# Patient Record
Sex: FEMALE | Race: OTHER | Employment: UNEMPLOYED | URBAN - METROPOLITAN AREA
[De-identification: names, ages, dates, MRNs, and addresses within clinical notes are randomized per-mention and may not be internally consistent; named-entity substitution may affect disease eponyms.]

---

## 2019-10-23 LAB
ANTIBODY SCREEN, EXTERNAL: NEGATIVE
CHLAMYDIA, EXTERNAL: NEGATIVE
HBSAG, EXTERNAL: NEGATIVE
HIV, EXTERNAL: NONREACTIVE
N. GONORRHEA, EXTERNAL: NEGATIVE
RPR, EXTERNAL: NONREACTIVE
RUBELLA, EXTERNAL: NORMAL
TYPE, ABO & RH, EXTERNAL: NORMAL

## 2020-04-07 LAB — GRBS, EXTERNAL: NEGATIVE

## 2020-04-20 ENCOUNTER — HOSPITAL ENCOUNTER (INPATIENT)
Age: 35
LOS: 2 days | Discharge: HOME | End: 2020-04-22
Attending: SPECIALIST | Admitting: SPECIALIST
Payer: SELF-PAY

## 2020-04-20 PROBLEM — Z34.90 PREGNANCY: Status: ACTIVE | Noted: 2020-04-20

## 2020-04-20 LAB
ALBUMIN SERPL-MCNC: 3 G/DL (ref 3.5–5)
ALBUMIN/GLOB SERPL: 0.7 {RATIO} (ref 1.1–2.2)
ALP SERPL-CCNC: 143 U/L (ref 45–117)
ALT SERPL-CCNC: 17 U/L (ref 12–78)
ANION GAP SERPL CALC-SCNC: 10 MMOL/L (ref 5–15)
AST SERPL-CCNC: 23 U/L (ref 15–37)
BASOPHILS # BLD: 0 K/UL (ref 0–0.1)
BASOPHILS NFR BLD: 0 % (ref 0–1)
BILIRUB SERPL-MCNC: 0.3 MG/DL (ref 0.2–1)
BUN SERPL-MCNC: 12 MG/DL (ref 6–20)
BUN/CREAT SERPL: 19 (ref 12–20)
CALCIUM SERPL-MCNC: 9.3 MG/DL (ref 8.5–10.1)
CHLORIDE SERPL-SCNC: 105 MMOL/L (ref 97–108)
CO2 SERPL-SCNC: 21 MMOL/L (ref 21–32)
CREAT SERPL-MCNC: 0.64 MG/DL (ref 0.55–1.02)
DIFFERENTIAL METHOD BLD: ABNORMAL
EOSINOPHIL # BLD: 0.1 K/UL (ref 0–0.4)
EOSINOPHIL NFR BLD: 1 % (ref 0–7)
ERYTHROCYTE [DISTWIDTH] IN BLOOD BY AUTOMATED COUNT: 13.5 % (ref 11.5–14.5)
GLOBULIN SER CALC-MCNC: 4.6 G/DL (ref 2–4)
GLUCOSE SERPL-MCNC: 79 MG/DL (ref 65–100)
HCT VFR BLD AUTO: 36.1 % (ref 35–47)
HGB BLD-MCNC: 12.4 G/DL (ref 11.5–16)
IMM GRANULOCYTES # BLD AUTO: 0.1 K/UL (ref 0–0.04)
IMM GRANULOCYTES NFR BLD AUTO: 1 % (ref 0–0.5)
LYMPHOCYTES # BLD: 1.5 K/UL (ref 0.8–3.5)
LYMPHOCYTES NFR BLD: 13 % (ref 12–49)
MCH RBC QN AUTO: 31.2 PG (ref 26–34)
MCHC RBC AUTO-ENTMCNC: 34.3 G/DL (ref 30–36.5)
MCV RBC AUTO: 90.9 FL (ref 80–99)
MONOCYTES # BLD: 0.8 K/UL (ref 0–1)
MONOCYTES NFR BLD: 7 % (ref 5–13)
NEUTS SEG # BLD: 9 K/UL (ref 1.8–8)
NEUTS SEG NFR BLD: 78 % (ref 32–75)
NRBC # BLD: 0 K/UL (ref 0–0.01)
NRBC BLD-RTO: 0 PER 100 WBC
PLATELET # BLD AUTO: 206 K/UL (ref 150–400)
PMV BLD AUTO: 10.9 FL (ref 8.9–12.9)
POTASSIUM SERPL-SCNC: 4 MMOL/L (ref 3.5–5.1)
PROT SERPL-MCNC: 7.6 G/DL (ref 6.4–8.2)
RBC # BLD AUTO: 3.97 M/UL (ref 3.8–5.2)
SODIUM SERPL-SCNC: 136 MMOL/L (ref 136–145)
WBC # BLD AUTO: 11.4 K/UL (ref 3.6–11)

## 2020-04-20 PROCEDURE — 75410000003 HC RECOV DEL/VAG/CSECN EA 0.5 HR

## 2020-04-20 PROCEDURE — 75410000002 HC LABOR FEE PER 1 HR

## 2020-04-20 PROCEDURE — 85025 COMPLETE CBC W/AUTO DIFF WBC: CPT

## 2020-04-20 PROCEDURE — 80053 COMPREHEN METABOLIC PANEL: CPT

## 2020-04-20 PROCEDURE — 75410000000 HC DELIVERY VAGINAL/SINGLE

## 2020-04-20 PROCEDURE — 74011250636 HC RX REV CODE- 250/636: Performed by: OBSTETRICS & GYNECOLOGY

## 2020-04-20 PROCEDURE — 65410000002 HC RM PRIVATE OB

## 2020-04-20 PROCEDURE — 36415 COLL VENOUS BLD VENIPUNCTURE: CPT

## 2020-04-20 RX ORDER — METHYLERGONOVINE MALEATE 0.2 MG/ML
INJECTION INTRAVENOUS
Status: DISCONTINUED
Start: 2020-04-20 | End: 2020-04-20 | Stop reason: WASHOUT

## 2020-04-20 RX ORDER — SODIUM CHLORIDE 0.9 % (FLUSH) 0.9 %
SYRINGE (ML) INJECTION
Status: COMPLETED
Start: 2020-04-20 | End: 2020-04-20

## 2020-04-20 RX ORDER — SODIUM CHLORIDE 0.9 % (FLUSH) 0.9 %
5-40 SYRINGE (ML) INJECTION AS NEEDED
Status: DISCONTINUED | OUTPATIENT
Start: 2020-04-20 | End: 2020-04-22 | Stop reason: HOSPADM

## 2020-04-20 RX ORDER — SODIUM CHLORIDE 0.9 % (FLUSH) 0.9 %
5-40 SYRINGE (ML) INJECTION EVERY 8 HOURS
Status: DISCONTINUED | OUTPATIENT
Start: 2020-04-20 | End: 2020-04-22 | Stop reason: HOSPADM

## 2020-04-20 RX ORDER — NALOXONE HYDROCHLORIDE 0.4 MG/ML
0.4 INJECTION, SOLUTION INTRAMUSCULAR; INTRAVENOUS; SUBCUTANEOUS AS NEEDED
Status: DISCONTINUED | OUTPATIENT
Start: 2020-04-20 | End: 2020-04-21 | Stop reason: HOSPADM

## 2020-04-20 RX ORDER — OXYTOCIN/RINGER'S LACTATE 20/1000 ML
2 PLASTIC BAG, INJECTION (ML) INTRAVENOUS
Status: DISCONTINUED | OUTPATIENT
Start: 2020-04-20 | End: 2020-04-22 | Stop reason: HOSPADM

## 2020-04-20 RX ORDER — SODIUM CHLORIDE, SODIUM LACTATE, POTASSIUM CHLORIDE, CALCIUM CHLORIDE 600; 310; 30; 20 MG/100ML; MG/100ML; MG/100ML; MG/100ML
125 INJECTION, SOLUTION INTRAVENOUS CONTINUOUS
Status: DISCONTINUED | OUTPATIENT
Start: 2020-04-20 | End: 2020-04-22 | Stop reason: HOSPADM

## 2020-04-20 RX ORDER — ONDANSETRON 2 MG/ML
4 INJECTION INTRAMUSCULAR; INTRAVENOUS
Status: DISCONTINUED | OUTPATIENT
Start: 2020-04-20 | End: 2020-04-21 | Stop reason: HOSPADM

## 2020-04-20 RX ADMIN — Medication 10 ML: at 16:10

## 2020-04-20 RX ADMIN — OXYTOCIN 19980 MILLI-UNITS/HR: 10 INJECTION INTRAVENOUS at 23:14

## 2020-04-20 NOTE — H&P
History & Physical    Name: Mandy Mendez MRN: [id number]  SSN: [id number]    YOB: 1985  Age: 29 y.o. Sex: female        Subjective:     Estimated Date of Delivery: 20  OB History    Para Term  AB Living   4 2 2   1 2   SAB TAB Ectopic Molar Multiple Live Births                    # Outcome Date GA Lbr Vineet/2nd Weight Sex Delivery Anes PTL Lv   4 Current            3 AB            2 Term            1 Term                Ms. Denise Anguiano is admitted with pregnancy at 36w6d for active labor. Prenatal course was normal. Please see prenatal records for details. Past Medical History:   Diagnosis Date    Essential hypertension      No past surgical history on file. Social History     Occupational History    Not on file   Tobacco Use    Smoking status: Never Smoker    Smokeless tobacco: Never Used   Substance and Sexual Activity    Alcohol use: Not Currently    Drug use: Never    Sexual activity: Yes     Partners: Male     No family history on file. No Known Allergies  Prior to Admission medications    Medication Sig Start Date End Date Taking? Authorizing Provider   PNV No12-Iron-[id number]-OM-3 29 mg iron-1 mg -50 mg CPKD Take  by mouth. Yes Provider, Historical        Review of Systems: A comprehensive review of systems was negative except for that written in the HPI.     Objective:     Vitals:  Vitals:    20 1600 20 1643   BP: (P) 118/77    Pulse: (P) 90    Resp: (P) 16    Temp: (P) 98.2 °F (36.8 °C)    SpO2: (P) 99%    Weight:  66.2 kg (146 lb)   Height:  5' 2\" (1.575 m)        Physical Exam:  Cervix 3/80/0  Nit +, +pooling in office  Membranes:  SROM, clear  Fetal Heart Rate: Reactive    Prenatal Labs:   No results found for: ABORH, RUBELLAEXT, GRBSEXT, HBSAGEXT, HIVEXT, RPREXT, GONNOEXT, CHLAMEXT, ABORHEXT, RUBELLAEXT, GRBSEXT, HBSAGEXT, HIVEXT, RPREXT, GONNOEXT, CHLAMEXT      Assessment/Plan:     Active Problems:    Pregnancy (2020)         Plan: Admit for Continue plan for vaginal delivery. Group B Strep was negative.

## 2020-04-20 NOTE — PROGRESS NOTES
Late Enrty:     2631 Bedside SBAR report received from Stefanie Garcia RN   On J[place]on [place],  in room  3160  ,care assumed. 1750 Back to bed,     1920  Verbal bedside report given to ANNA Simmons RN,On [facility], in room 3160. Report consisted of patients Situation, Background, Assessment and Recommendations(SBAR). Information from the following report(s) SBAR, Kardex, Intake/Output, MAR and Recent Results were reviewed with the receiving nurse. Opportunity for questions and clarification was provided,care assumed.

## 2020-04-21 PROCEDURE — 65410000002 HC RM PRIVATE OB

## 2020-04-21 PROCEDURE — 74011250637 HC RX REV CODE- 250/637: Performed by: OBSTETRICS & GYNECOLOGY

## 2020-04-21 RX ORDER — ONDANSETRON 2 MG/ML
4 INJECTION INTRAMUSCULAR; INTRAVENOUS
Status: DISCONTINUED | OUTPATIENT
Start: 2020-04-21 | End: 2020-04-22 | Stop reason: HOSPADM

## 2020-04-21 RX ORDER — LANOLIN ALCOHOL/MO/W.PET/CERES
1 CREAM (GRAM) TOPICAL
Status: DISCONTINUED | OUTPATIENT
Start: 2020-04-21 | End: 2020-04-22 | Stop reason: HOSPADM

## 2020-04-21 RX ORDER — HYDROCODONE BITARTRATE AND ACETAMINOPHEN 5; 325 MG/1; MG/1
1 TABLET ORAL
Status: DISCONTINUED | OUTPATIENT
Start: 2020-04-21 | End: 2020-04-22 | Stop reason: HOSPADM

## 2020-04-21 RX ORDER — SIMETHICONE 80 MG
80 TABLET,CHEWABLE ORAL
Status: DISCONTINUED | OUTPATIENT
Start: 2020-04-21 | End: 2020-04-22 | Stop reason: HOSPADM

## 2020-04-21 RX ORDER — HYDROMORPHONE HYDROCHLORIDE 1 MG/ML
1 INJECTION, SOLUTION INTRAMUSCULAR; INTRAVENOUS; SUBCUTANEOUS
Status: DISCONTINUED | OUTPATIENT
Start: 2020-04-21 | End: 2020-04-22 | Stop reason: HOSPADM

## 2020-04-21 RX ORDER — ZOLPIDEM TARTRATE 5 MG/1
5 TABLET ORAL
Status: DISCONTINUED | OUTPATIENT
Start: 2020-04-21 | End: 2020-04-22 | Stop reason: HOSPADM

## 2020-04-21 RX ORDER — DIPHENHYDRAMINE HYDROCHLORIDE 50 MG/ML
12.5 INJECTION, SOLUTION INTRAMUSCULAR; INTRAVENOUS
Status: DISCONTINUED | OUTPATIENT
Start: 2020-04-21 | End: 2020-04-22 | Stop reason: HOSPADM

## 2020-04-21 RX ORDER — DOCUSATE SODIUM 100 MG/1
100 CAPSULE, LIQUID FILLED ORAL
Status: DISCONTINUED | OUTPATIENT
Start: 2020-04-21 | End: 2020-04-22 | Stop reason: HOSPADM

## 2020-04-21 RX ORDER — OXYTOCIN/RINGER'S LACTATE 20/1000 ML
125-500 PLASTIC BAG, INJECTION (ML) INTRAVENOUS ONCE
Status: COMPLETED | OUTPATIENT
Start: 2020-04-21 | End: 2020-04-20

## 2020-04-21 RX ORDER — HYDROCORTISONE ACETATE PRAMOXINE HCL 2.5; 1 G/100G; G/100G
CREAM TOPICAL AS NEEDED
Status: DISCONTINUED | OUTPATIENT
Start: 2020-04-21 | End: 2020-04-22 | Stop reason: HOSPADM

## 2020-04-21 RX ORDER — IBUPROFEN 400 MG/1
800 TABLET ORAL EVERY 8 HOURS
Status: DISCONTINUED | OUTPATIENT
Start: 2020-04-21 | End: 2020-04-22 | Stop reason: HOSPADM

## 2020-04-21 RX ORDER — SWAB
1 SWAB, NON-MEDICATED MISCELLANEOUS DAILY
Status: DISCONTINUED | OUTPATIENT
Start: 2020-04-21 | End: 2020-04-22 | Stop reason: HOSPADM

## 2020-04-21 RX ORDER — ACETAMINOPHEN 325 MG/1
650 TABLET ORAL
Status: DISCONTINUED | OUTPATIENT
Start: 2020-04-21 | End: 2020-04-22 | Stop reason: HOSPADM

## 2020-04-21 RX ORDER — NALOXONE HYDROCHLORIDE 0.4 MG/ML
0.4 INJECTION, SOLUTION INTRAMUSCULAR; INTRAVENOUS; SUBCUTANEOUS AS NEEDED
Status: DISCONTINUED | OUTPATIENT
Start: 2020-04-21 | End: 2020-04-22 | Stop reason: HOSPADM

## 2020-04-21 RX ADMIN — FERROUS SULFATE TAB 325 MG (65 MG ELEMENTAL FE) 325 MG: 325 (65 FE) TAB at 08:54

## 2020-04-21 RX ADMIN — IBUPROFEN 800 MG: 400 TABLET, FILM COATED ORAL at 08:55

## 2020-04-21 RX ADMIN — Medication 1 TABLET: at 08:54

## 2020-04-21 RX ADMIN — IBUPROFEN 800 MG: 400 TABLET, FILM COATED ORAL at 00:24

## 2020-04-21 RX ADMIN — HYDROCODONE BITARTRATE AND ACETAMINOPHEN 1 TABLET: 5; 325 TABLET ORAL at 02:52

## 2020-04-21 RX ADMIN — IBUPROFEN 800 MG: 400 TABLET, FILM COATED ORAL at 17:13

## 2020-04-21 NOTE — ROUTINE PROCESS
Bedside shift change report given to OMERO Klein RN (oncoming nurse) by Aleksandra Lawrence. Piotr Ruvalcaba RN (offgoing nurse). Report included the following information SBAR, Kardex, Intake/Output, MAR, and Recent Results.

## 2020-04-21 NOTE — L&D DELIVERY NOTE
This patient was 30 or more weeks gestation at the time of [facility] go-live. For complete information pertaining to this patient's pregnancy, please refer to the paper chart and ACOG form. Delivery Note    Obstetrician:  Andi Anguiano MD    Assistant: none    Pre-Delivery Diagnosis:  pregnancy <37 weeks, Spontaneous labor and Pregnancy complicated by: PPROM    Post-Delivery Diagnosis: Female    Intrapartum Event:  labor (between 20-37 weeks)    Procedure: Spontaneous vaginal delivery    Epidural: NO    Monitor:  Fetal Heart Tones - External and Uterine Contractions - External    Indications for instrumental delivery: none    Estimated Blood Loss: 500    Episiotomy: none    Laceration(s):  none    Laceration(s) repair: NO    Presentation: Cephalic    Fetal Description: rojo    Fetal Position: Occiput Anterior    Birth Weight: pending    Birth Length: pending    Apgar - One Minute: 9    Apgar - Five Minutes: 9    Umbilical Cord: Nuchal Cord x  1 and 3 vessels present    Specimens: placenta                  Complications:  Mild Shoulder dystocia 30 seconds, Yokasta , GENTAL TRACTION    Cord Blood Results:   Information for the patient's :  Festus Walker [[id number]]   No results found for: PCTABR, ABORH, PCTDIG, BILI, ABORH, ABORHEXT    Prenatal Labs:     Lab Results   Component Value Date/Time    ABO,Rh O Positive 10/23/2019    HBsAg, External negative 10/23/2019    HIV, External nonreactive 10/23/2019    Rubella, External Immune 10/23/2019    RPR, External nonreactive 10/23/2019    Gonorrhea, External negative 10/23/2019    Chlamydia, External negative 10/23/2019    GrBStrep, External Negative 2020        Attending Attestation: I performed the procedure    Signed By:  Andi Anguiano MD     2020          Delivery Summary    Patient: Mike Or MRN: [id number]  SSN: [id number]    YOB: 1985  Age: 29 y.o.   Sex: female       Information for the patient's Eagle Bertrand [[id number]]       Labor Events:    Labor: Yes    Steroids: None   Cervical Ripening Date/Time:       Cervical Ripening Type: None   Antibiotics During Labor: No   Rupture Identifier: Sac 1    Rupture Date/Time: 2020 8:00 AM   Rupture Type: SROM   Amniotic Fluid Volume: Moderate    Amniotic Fluid Description: Clear    Amniotic Fluid Odor:      Induction: None       Induction Date/Time:        Indications for Induction:      Augmentation: None   Augmentation Date/Time:      Indications for Augmentation:     Labor complications: Additional complications:        Delivery Events:  Indications For Episiotomy:     Episiotomy: None   Perineal Laceration(s):     Repaired:     Periurethral Laceration Location:      Repaired:     Labial Laceration Location:     Repaired:     Sulcal Laceration Location:     Repaired:     Vaginal Laceration Location:     Repaired:     Cervical Laceration Location:     Repaired:     Repair Suture: None   Number of Repair Packets:     Estimated Blood Loss (ml):  ml   Quantitative Blood Loss (ml)                Delivery Date: 2020    Delivery Time: 11:09 PM  Delivery Type:    Sex:  Female    Gestational Age: 36w6d   Delivery Clinician:  Luisito Vale  Living Status: Living   Delivery Location: L&D            APGARS  One minute Five minutes Ten minutes   Skin color: 1   1        Heart rate: 2   2        Grimace: 2   2        Muscle tone: 2   2        Breathin   2        Totals: 9   9            Presentation: Vertex    Position:   Occiput Anterior  Resuscitation Method:  Tactile Stimulation     Meconium Stained: None      Cord Information: 3 Vessels  Complications: Nuchal Cord Without Compressions  Cord around: head  Delayed cord clamping?  Yes  Cord clamped date/time:2020 11:10 PM  Disposition of Cord Blood: Lab    Blood Gases Sent?: No    Placenta:  Date/Time: 2020 11:14 PM  Removal: Spontaneous      Appearance: Normal     [place] Measurements:  Birth Weight: 2.785 kg      Birth Length: 48.3 cm      Head Circumference: 32 cm      Chest Circumference: 33 cm     Abdominal Girth: 29 cm    Other Providers:   KARINA Kaplan;LINDA RUGGIERO;LONNIE WHEELER;;;;;;;OVI FRITZ P;FILIBERTO PARKER, Obstetrician;Primary Nurse;Primary [place] Nurse;Nicu Nurse;Neonatologist;Anesthesiologist;Crna;Nurse Practitioner;Midwife;Nursery Nurse;Staff Nurse           Group B Strep:   Lab Results   Component Value Date/Time    GrBStrep, External Negative 2020     Information for the patient's :  Emmy Martin [[id number]]   No results found for: ABORH, PCTABR, PCTDIG, BILI, ABORHEXT, ABORH    No results for input(s): PCO2CB, PO2CB, HCO3I, SO2I, IBD, PTEMPI, SPECTI, PHICB, ISITE, IDEV, IALLEN in the last 72 hours. ,   Information for the patient's :  Emmy Martin [[id number]]   Shoulder Dystocia Details:       Affected Side: right      Date and Time Recognized: 2020 11:09 PM      Help Called By:   at 2020 11:09 PM      Physician/Provider: Dr Vikas Montaño arrived at        NICU Staff:   arrived at        Additional Staff Arrived:   at        Gentle Attempt at Traction: 1   If no, why:        First Maneuver: Yokasta maneuver at 2020 11:09 PM by ANNA Ruggiero RN, Jose Daniel Lynch RN      Second Maneuver:   at   by        [employer]:   at   by

## 2020-04-21 NOTE — PROGRESS NOTES
TRANSFER - IN REPORT:    Verbal report received from ANNA Ruggiero RN(name) on Sonal Li  being received from L&D(unit) for routine progression of care      Report consisted of patients Situation, Background, Assessment and   Recommendations(SBAR). Information from the following report(s) SBAR, Procedure Summary, Intake/Output, MAR and Recent Results was reviewed with the receiving nurse. Opportunity for questions and clarification was provided.

## 2020-04-21 NOTE — PROGRESS NOTES
Post-Partum Day Number 1 Progress Note    Patient doing well post-partum without significant complaint. Voiding withour difficulty, normal lochia. Vitals:    Patient Vitals for the past 8 hrs:   BP Temp Pulse Resp SpO2   20 0330 123/84 98.7 °F (37.1 °C) 76 18    20 0230 118/79 98.8 °F (37.1 °C) 73 17    20 0139 123/72  94 16    20 0109 136/71  80 16    20 0106     98 %   20 0056     99 %   20 0054 148/72  86     20 0024 142/85  78     20 0010 142/90  92     20 2357  98.9 °F (37.2 °C)      20 2353 128/75  93 18    20 2351     100 %   20 2346     99 %     Temp (24hrs), Av.4 °F (36.9 °C), Min:98 °F (36.7 °C), Max:98.9 °F (37.2 °C)      Vital signs stable, afebrile. Exam:  Patient without distress. Abdomen soft, fundus firm at level of umbilicus, nontender               Lower extremities are negative for swelling, cords or tenderness. Labs:   Recent Results (from the past 24 hour(s))   CBC WITH AUTOMATED DIFF    Collection Time: 20  4:06 PM   Result Value Ref Range    WBC 11.4 (H) 3.6 - 11.0 K/uL    RBC 3.97 3.80 - 5.20 M/uL    HGB 12.4 11.5 - 16.0 g/dL    HCT 36.1 35.0 - 47.0 %    MCV 90.9 80.0 - 99.0 FL    MCH 31.2 26.0 - 34.0 PG    MCHC 34.3 30.0 - 36.5 g/dL    RDW 13.5 11.5 - 14.5 %    PLATELET 922 293 - 628 K/uL    MPV 10.9 8.9 - 12.9 FL    NRBC 0.0 0  WBC    ABSOLUTE NRBC 0.00 0.00 - 0.01 K/uL    NEUTROPHILS 78 (H) 32 - 75 %    LYMPHOCYTES 13 12 - 49 %    MONOCYTES 7 5 - 13 %    EOSINOPHILS 1 0 - 7 %    BASOPHILS 0 0 - 1 %    IMMATURE GRANULOCYTES 1 (H) 0.0 - 0.5 %    ABS. NEUTROPHILS 9.0 (H) 1.8 - 8.0 K/UL    ABS. LYMPHOCYTES 1.5 0.8 - 3.5 K/UL    ABS. MONOCYTES 0.8 0.0 - 1.0 K/UL    ABS. EOSINOPHILS 0.1 0.0 - 0.4 K/UL    ABS. BASOPHILS 0.0 0.0 - 0.1 K/UL    ABS. IMM.  GRANS. 0.1 (H) 0.00 - 0.04 K/UL    DF AUTOMATED     METABOLIC PANEL, COMPREHENSIVE    Collection Time: 04/20/20  4:06 PM   Result Value Ref Range    Sodium 136 136 - 145 mmol/L    Potassium 4.0 3.5 - 5.1 mmol/L    Chloride 105 97 - 108 mmol/L    CO2 21 21 - 32 mmol/L    Anion gap 10 5 - 15 mmol/L    Glucose 79 65 - 100 mg/dL    BUN 12 6 - 20 MG/DL    Creatinine 0.64 0.55 - 1.02 MG/DL    BUN/Creatinine ratio 19 12 - 20      GFR est AA >60 >60 ml/min/1.73m2    GFR est non-AA >60 >60 ml/min/1.73m2    Calcium 9.3 8.5 - 10.1 MG/DL    Bilirubin, total 0.3 0.2 - 1.0 MG/DL    ALT (SGPT) 17 12 - 78 U/L    AST (SGOT) 23 15 - 37 U/L    Alk. phosphatase 143 (H) 45 - 117 U/L    Protein, total 7.6 6.4 - 8.2 g/dL    Albumin 3.0 (L) 3.5 - 5.0 g/dL    Globulin 4.6 (H) 2.0 - 4.0 g/dL    A-G Ratio 0.7 (L) 1.1 - 2.2         Assessment and Plan:  Patient appears to be having uncomplicated post-partum course. Continue routine perineal care and maternal education. Plan discharge tomorrow if no problems occur.

## 2020-04-21 NOTE — PROGRESS NOTES
1925:  Report from OMERO Hernandez RN. Assuming care of pt. Pt is resting in bed, coping well with labor. Desires unmedicated birth. Dr Carissa Cuenca to bs, SVE.  4cm. 1947:  Pt off monitor so she can ambulate and move about her room. 2030:  Pt continues to cope with labor. No needs expressed. EFM applied. 2057:  Off monitor, up to shower. 2216:  Pt reports she believes she had leaking fluid at 0800 this am.  At approximately 1100 she had a large gush and that's when she knew her membranes had ruptured. 2258: Pt feeling immense pressure with last uc and feels like baby coming. Dr called to room and he did sve. Complete. Set up for delivery. 2309:  Delivery. Shoulder dystocia per Dr Pavel Chaves resolved with Yokasta maneuver. 30 sec from delivery of head to delivery of body. 2320:  Straight catheterization to empty bladder. 0118:  Pt eating crackers, drinking PO fluids and tolerating well. Nursing infant since delivery. Pt assisted up to br, no dizziness. Unable to void. Pt in shower now. 0130:  Pt back to bed, Frozen meal prepared for pt. TRANSFER - OUT REPORT:    Verbal report given to SHERI Lopez RN(name) on Reese Marino  being transferred to MIU(unit) for routine progression of care       Report consisted of patients Situation, Background, Assessment and   Recommendations(SBAR). Information from the following report(s) SBAR, Intake/Output, MAR and Recent Results was reviewed with the receiving nurse. Lines:   Peripheral IV 04/20/20 (Active)   Site Assessment Clean, dry, & intact 4/20/2020  7:45 PM   Phlebitis Assessment 0 4/20/2020  7:45 PM   Infiltration Assessment 0 4/20/2020  7:45 PM   Dressing Status Clean, dry, & intact 4/20/2020  7:45 PM   Dressing Type Tape;Transparent 4/20/2020  7:45 PM   Hub Color/Line Status Pink;Capped 4/20/2020  7:45 PM        Opportunity for questions and clarification was provided.

## 2020-04-22 VITALS
RESPIRATION RATE: 16 BRPM | HEART RATE: 87 BPM | TEMPERATURE: 97.8 F | SYSTOLIC BLOOD PRESSURE: 128 MMHG | WEIGHT: 146 LBS | HEIGHT: 62 IN | BODY MASS INDEX: 26.87 KG/M2 | OXYGEN SATURATION: 98 % | DIASTOLIC BLOOD PRESSURE: 79 MMHG

## 2020-04-22 LAB
HCT VFR BLD AUTO: 34.6 % (ref 35–47)
HGB BLD-MCNC: 11.5 G/DL (ref 11.5–16)

## 2020-04-22 PROCEDURE — 36415 COLL VENOUS BLD VENIPUNCTURE: CPT

## 2020-04-22 PROCEDURE — 74011250637 HC RX REV CODE- 250/637: Performed by: OBSTETRICS & GYNECOLOGY

## 2020-04-22 PROCEDURE — 85018 HEMOGLOBIN: CPT

## 2020-04-22 RX ORDER — IBUPROFEN 800 MG/1
800 TABLET ORAL
Qty: 30 TAB | Refills: 0 | Status: SHIPPED | OUTPATIENT
Start: 2020-04-22

## 2020-04-22 RX ADMIN — IBUPROFEN 800 MG: 400 TABLET, FILM COATED ORAL at 08:04

## 2020-04-22 RX ADMIN — IBUPROFEN 800 MG: 400 TABLET, FILM COATED ORAL at 00:21

## 2020-04-22 RX ADMIN — FERROUS SULFATE TAB 325 MG (65 MG ELEMENTAL FE) 325 MG: 325 (65 FE) TAB at 08:04

## 2020-04-22 RX ADMIN — Medication 1 TABLET: at 08:04

## 2020-04-22 NOTE — PROGRESS NOTES
Post-Partum Day Number 2 Progress Note    Qi Galan     Information for the patient's :  Avinash Preciado [[id number]]   Vaginal, Spontaneous   Patient doing well without significant complaint. Voiding without difficulty, normal lochia. Vitals:    Visit Vitals  /70 (BP 1 Location: Left arm, BP Patient Position: Sitting;Post activity)   Pulse (!) 106   Temp 97.6 °F (36.4 °C)   Resp 14   Ht 5' 2\" (1.575 m)   Wt 66.2 kg (146 lb)   SpO2 98%   Breastfeeding Unknown   BMI 26.70 kg/m²     Temp (24hrs), Av.7 °F (36.5 °C), Min:97.6 °F (36.4 °C), Max:97.8 °F (36.6 °C)          Exam:         Patient without distress. Abdomen soft, fundus firm, nontender                Lower extremities are negative for swelling, cords or tenderness. Labs:     Lab Results   Component Value Date/Time    WBC 11.4 (H) 2020 04:06 PM    HGB 11.5 2020 12:11 AM    HGB 12.4 2020 04:06 PM    HCT 34.6 (L) 2020 12:11 AM    HCT 36.1 2020 04:06 PM    PLATELET 710  04:06 PM       Recent Results (from the past 24 hour(s))   HGB & HCT    Collection Time: 20 12:11 AM   Result Value Ref Range    HGB 11.5 11.5 - 16.0 g/dL    HCT 34.6 (L) 35.0 - 47.0 %       Assessment: Doing well, post partum day 2      Plan:   1. Discharge home today  2. Follow up in office in 4 weeks with Jonna Ochoa MD  3. Post partum activity advised, diet as tolerated  4.  Discharge Medications:Tylenol

## 2020-04-22 NOTE — DISCHARGE SUMMARY
Obstetrical Discharge Summary     Name: Sho Roman MRN: [id number]  SSN: [id number]    YOB: 1985  Age: 29 y.o. Sex: female      Allergies: Patient has no known allergies. Admit Date: 2020    Discharge Date: 2020     Admitting Physician: Luma Blackmon MD     Attending Physician:  Renee Shabazz MD     * Admission Diagnoses: Pregnancy [Z34.90]    * Discharge Diagnoses:   Information for the patient's :  Valeria Scott [[id number]]   Delivery of a 2.785 kg female infant via Vaginal, Spontaneous on 2020 at 11:09 PM  by Farhat Haque. Apgars were 9  and 9 . Additional Diagnoses:   Hospital Problems as of 2020 Never Reviewed          Codes Class Noted - Resolved POA    Pregnancy ICD-10-CM: Z34.90  ICD-9-CM: V22.2  2020 - Present Unknown             Lab Results   Component Value Date/Time    Rubella, External Immune 10/23/2019    GrBStrep, External Negative 2020    ABO,Rh O Positive 10/23/2019    There is no immunization history for the selected administration types on file for this patient. * Procedures:   * No surgery found *           * Discharge Condition: [facility] Course: Normal hospital course following the delivery. * Disposition: Home    Discharge Medications:   Current Discharge Medication List          * Follow-up Care/Patient Instructions: Activity: No lifting, Driving, or Strenuous exercise for until follow up in office,  No sex for 4-6 weeks  Diet: as tolerated  Wound Care: None needed    Follow-up Information     Follow up With Specialties Details Why Contact Info    Hemalatha Winchester MD Obstetrics & Gynecology, Gynecology, [address]  [address]  [phone]                        .

## 2020-04-22 NOTE — ROUTINE PROCESS
Bedside and Verbal shift change report given to OMERO Kate (oncoming nurse) by Trudy Black RN (offgoing nurse). Report included the following information SBAR.

## 2020-04-22 NOTE — ROUTINE PROCESS
Patient discharge prescriptions & instructions given. Verbalized understanding. All questions answered. No distress noted. Signed copy of discharge instructions on paper chart. Will call to schedule 4 week OB follow up, sooner if needed.

## 2020-04-22 NOTE — DISCHARGE INSTRUCTIONS
Patient Education        Vaginal Childbirth: Care Instructions  Your Care Instructions    Your body will slowly heal in the next few weeks. It is easy to get too tired and overwhelmed during the first weeks after your baby is born. Changes in your hormones can shift your mood without warning. You may find it hard to meet the extra demands on your energy and time. Take it easy on yourself. Follow-up care is a key part of your treatment and safety. Be sure to make and go to all appointments, and call your doctor if you are having problems. It's also a good idea to know your test results and keep a list of the medicines you take. How can you care for yourself at home? · Vaginal bleeding and cramps  ? After delivery, you will have a bloody discharge from the vagina. This will turn pink within a week and then white or yellow after about 10 days. It may last for 2 to 4 weeks or longer, until the uterus has healed. Use pads instead of tampons until you stop bleeding. ? Do not worry if you pass some blood clots, as long as they are smaller than a golf ball. If you have a tear or stitches in your vaginal area, change the pad at least every 4 hours to prevent soreness and infection. ? You may have cramps for the first few days after childbirth. These are normal and occur as the uterus shrinks to normal size. Take an over-the-counter pain medicine, such as acetaminophen (Tylenol), ibuprofen (Advil, Motrin), or naproxen (Aleve), for cramps. Read and follow all instructions on the label. Do not take aspirin, because it can cause more bleeding. ? Do not take two or more pain medicines at the same time unless the doctor told you to. Many pain medicines have acetaminophen, which is Tylenol. Too much acetaminophen (Tylenol) can be harmful. · Stitches  ? If you have stitches, they will dissolve on their own and do not need to be removed. Follow your doctor's instructions for cleaning the stitched area.   ? Put ice or a cold pack on your painful area for 10 to 20 minutes at a time, several times a day, for the first few days. Put a thin cloth between the ice and your skin. ? Sit in a few inches of warm water (sitz bath) 3 times a day and after bowel movements. The warm water helps with pain and itching. If you do not have a tub, a warm shower might help. · Breast fullness  ? Your breasts may overfill (engorge) in the first few days after delivery. To help milk flow and to relieve pain, warm your breasts in the shower or by using warm, moist towels before nursing. ? If you are not nursing, do not put warmth on your breasts or touch your breasts. Wear a tight bra or sports bra and use ice until the fullness goes away. This usually takes 2 to 3 days. ? Put ice or a cold pack on your breast after nursing to reduce swelling and pain. Put a thin cloth between the ice and your skin. · Activity  ? Eat a balanced diet. Do not try to lose weight by cutting calories. Keep taking your prenatal vitamins, or take a multivitamin. ? Get as much rest as you can. Try to take naps when your baby sleeps during the day. ? Get some exercise every day. But do not do any heavy exercise until your doctor says it is okay. ? Wait until you are healed (about 4 to 6 weeks) before you have sexual intercourse. Your doctor will tell you when it is okay to have sex. ? Talk to your doctor about birth control. You can get pregnant even before your period returns. Also, you can get pregnant while you are breastfeeding. · Mental health  ? It is normal to have some sadness, anxiety, sleeplessness, and mood swings after you go home. If you feel upset or hopeless for more than a few days or are having trouble doing the things you need to do, talk to your doctor. · Constipation and hemorrhoids  ? Drink plenty of fluids, enough so that your urine is light yellow or clear like water.  If you have kidney, heart, or liver disease and have to limit fluids, talk with your doctor before you increase the amount of fluids you drink. ? Eat plenty of fiber each day. Have a bran muffin or bran cereal for breakfast, and try eating a piece of fruit for a mid-afternoon snack. ? For painful, itchy hemorrhoids, put ice or a cold pack on the area several times a day for 10 minutes at a time. Follow this by putting a warm compress on the area for another 10 to 20 minutes or by sitting in a shallow, warm bath. When should you call for help? Call  911 anytime you think you may need emergency care. For example, call if:    · You have thoughts of harming yourself, your baby, or another person.     · You passed out (lost consciousness).     · You have chest pain, are short of breath, or cough up blood.     · You have a seizure.    Call your doctor now or seek immediate medical care if:    · You have severe vaginal bleeding.     · You are dizzy or lightheaded, or you feel like you may faint.     · You have a fever.     · You have new or more pain in your belly or pelvis.     · You have symptoms of a blood clot in your leg (called a deep vein thrombosis), such as:  ? Pain in the calf, back of the knee, thigh, or groin. ? Redness and swelling in your leg or groin.     · You have signs of preeclampsia, such as:  ? Sudden swelling of your face, hands, or feet. ? New vision problems (such as dimness, blurring, or seeing spots). ? A severe headache.    Watch closely for changes in your health, and be sure to contact your doctor if:    · Your vaginal bleeding seems to be getting heavier.     · You have new or worse vaginal discharge.     · You feel sad, anxious, or hopeless for more than a few days.     · You do not get better as expected. Where can you learn more? Go to http://eulogio-abdias.info/  Enter Q237 in the search box to learn more about \"Vaginal Childbirth: Care Instructions. \"  Current as of: May 29, 2019Content Version: 12.4  © 4935-6264 [employer].   Care instructions adapted under license by [employer] (which disclaims liability or warranty for this information). If you have questions about a medical condition or this instruction, always ask your healthcare professional. [address] any warranty or liability for your use of this information.